# Patient Record
Sex: FEMALE | NOT HISPANIC OR LATINO | Employment: UNEMPLOYED | URBAN - METROPOLITAN AREA
[De-identification: names, ages, dates, MRNs, and addresses within clinical notes are randomized per-mention and may not be internally consistent; named-entity substitution may affect disease eponyms.]

---
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
## 2021-06-24 ENCOUNTER — OFFICE VISIT (OUTPATIENT)
Dept: FAMILY MEDICINE | Facility: CLINIC | Age: 21
End: 2021-06-24
Payer: COMMERCIAL

## 2021-06-24 ENCOUNTER — TELEPHONE (OUTPATIENT)
Dept: FAMILY MEDICINE | Facility: CLINIC | Age: 21
End: 2021-06-24

## 2021-06-24 VITALS
HEART RATE: 89 BPM | HEIGHT: 66 IN | SYSTOLIC BLOOD PRESSURE: 102 MMHG | TEMPERATURE: 98.8 F | BODY MASS INDEX: 19.61 KG/M2 | OXYGEN SATURATION: 99 % | WEIGHT: 122 LBS | DIASTOLIC BLOOD PRESSURE: 64 MMHG | RESPIRATION RATE: 14 BRPM

## 2021-06-24 DIAGNOSIS — N73.0 PID (ACUTE PELVIC INFLAMMATORY DISEASE): Primary | ICD-10-CM

## 2021-06-24 DIAGNOSIS — R59.0 LYMPHADENOPATHY, INGUINAL: ICD-10-CM

## 2021-06-24 DIAGNOSIS — Z11.4 SCREENING FOR HIV (HUMAN IMMUNODEFICIENCY VIRUS): ICD-10-CM

## 2021-06-24 DIAGNOSIS — N91.2 AMENORRHEA: ICD-10-CM

## 2021-06-24 DIAGNOSIS — M25.551 PAIN IN JOINT, PELVIC REGION AND THIGH, RIGHT: ICD-10-CM

## 2021-06-24 DIAGNOSIS — Z86.19 HISTORY OF CHLAMYDIA INFECTION: ICD-10-CM

## 2021-06-24 DIAGNOSIS — Z11.3 SCREEN FOR STD (SEXUALLY TRANSMITTED DISEASE): ICD-10-CM

## 2021-06-24 DIAGNOSIS — Z11.59 NEED FOR HEPATITIS C SCREENING TEST: ICD-10-CM

## 2021-06-24 DIAGNOSIS — Z11.3 SCREENING FOR STDS (SEXUALLY TRANSMITTED DISEASES): ICD-10-CM

## 2021-06-24 LAB
BASOPHILS # BLD AUTO: 0 10E9/L (ref 0–0.2)
BASOPHILS NFR BLD AUTO: 0.3 %
DIFFERENTIAL METHOD BLD: NORMAL
EOSINOPHIL # BLD AUTO: 0.7 10E9/L (ref 0–0.7)
EOSINOPHIL NFR BLD AUTO: 7.8 %
ERYTHROCYTE [DISTWIDTH] IN BLOOD BY AUTOMATED COUNT: 12.7 % (ref 10–15)
HCG UR QL: NEGATIVE
HCT VFR BLD AUTO: 42.2 % (ref 35–47)
HGB BLD-MCNC: 14.3 G/DL (ref 11.7–15.7)
LYMPHOCYTES # BLD AUTO: 3.4 10E9/L (ref 0.8–5.3)
LYMPHOCYTES NFR BLD AUTO: 38.2 %
MCH RBC QN AUTO: 30.3 PG (ref 26.5–33)
MCHC RBC AUTO-ENTMCNC: 33.9 G/DL (ref 31.5–36.5)
MCV RBC AUTO: 89 FL (ref 78–100)
MONOCYTES # BLD AUTO: 0.7 10E9/L (ref 0–1.3)
MONOCYTES NFR BLD AUTO: 7.5 %
NEUTROPHILS # BLD AUTO: 4.1 10E9/L (ref 1.6–8.3)
NEUTROPHILS NFR BLD AUTO: 46.2 %
PLATELET # BLD AUTO: 338 10E9/L (ref 150–450)
RBC # BLD AUTO: 4.72 10E12/L (ref 3.8–5.2)
SPECIMEN SOURCE: NORMAL
WBC # BLD AUTO: 8.8 10E9/L (ref 4–11)
WET PREP SPEC: NORMAL

## 2021-06-24 PROCEDURE — 87591 N.GONORRHOEAE DNA AMP PROB: CPT | Performed by: INTERNAL MEDICINE

## 2021-06-24 PROCEDURE — 99000 SPECIMEN HANDLING OFFICE-LAB: CPT | Performed by: INTERNAL MEDICINE

## 2021-06-24 PROCEDURE — 86780 TREPONEMA PALLIDUM: CPT | Mod: 90 | Performed by: INTERNAL MEDICINE

## 2021-06-24 PROCEDURE — 87389 HIV-1 AG W/HIV-1&-2 AB AG IA: CPT | Performed by: INTERNAL MEDICINE

## 2021-06-24 PROCEDURE — 86696 HERPES SIMPLEX TYPE 2 TEST: CPT | Performed by: INTERNAL MEDICINE

## 2021-06-24 PROCEDURE — 87210 SMEAR WET MOUNT SALINE/INK: CPT | Performed by: INTERNAL MEDICINE

## 2021-06-24 PROCEDURE — 36415 COLL VENOUS BLD VENIPUNCTURE: CPT | Performed by: INTERNAL MEDICINE

## 2021-06-24 PROCEDURE — 87491 CHLMYD TRACH DNA AMP PROBE: CPT | Performed by: INTERNAL MEDICINE

## 2021-06-24 PROCEDURE — 99203 OFFICE O/P NEW LOW 30 MIN: CPT | Mod: 25 | Performed by: INTERNAL MEDICINE

## 2021-06-24 PROCEDURE — 81025 URINE PREGNANCY TEST: CPT | Performed by: INTERNAL MEDICINE

## 2021-06-24 PROCEDURE — 86695 HERPES SIMPLEX TYPE 1 TEST: CPT | Performed by: INTERNAL MEDICINE

## 2021-06-24 PROCEDURE — 85025 COMPLETE CBC W/AUTO DIFF WBC: CPT | Performed by: INTERNAL MEDICINE

## 2021-06-24 PROCEDURE — 96372 THER/PROPH/DIAG INJ SC/IM: CPT | Performed by: INTERNAL MEDICINE

## 2021-06-24 RX ORDER — CEFTRIAXONE SODIUM 1 G
1 VIAL (EA) INJECTION ONCE
Status: COMPLETED | OUTPATIENT
Start: 2021-06-24 | End: 2021-06-24

## 2021-06-24 RX ORDER — DOXYCYCLINE 100 MG/1
100 CAPSULE ORAL 2 TIMES DAILY
Qty: 28 CAPSULE | Refills: 0 | Status: SHIPPED | OUTPATIENT
Start: 2021-06-24 | End: 2021-07-08

## 2021-06-24 RX ORDER — ALBUTEROL SULFATE 90 UG/1
AEROSOL, METERED RESPIRATORY (INHALATION)
COMMUNITY
Start: 2021-03-03 | End: 2022-09-26 | Stop reason: ALTCHOICE

## 2021-06-24 RX ORDER — METRONIDAZOLE 500 MG/1
500 TABLET ORAL 2 TIMES DAILY
Qty: 28 TABLET | Refills: 0 | Status: SHIPPED | OUTPATIENT
Start: 2021-06-24 | End: 2021-07-08

## 2021-06-24 RX ADMIN — Medication 1 G: at 18:05

## 2021-06-24 ASSESSMENT — MIFFLIN-ST. JEOR: SCORE: 1340.14

## 2021-06-24 NOTE — PROGRESS NOTES
Assessment & Plan     Diagnoses and all orders for this visit:    PID (acute pelvic inflammatory disease)  -     CEFTRIAXONE NA INJ /250MG  -     cefTRIAXone (ROCEPHIN) injection 1 g  -     metroNIDAZOLE (FLAGYL) 500 MG tablet; Take 1 tablet (500 mg) by mouth 2 times daily for 14 days  -     doxycycline hyclate (VIBRAMYCIN) 100 MG capsule; Take 1 capsule (100 mg) by mouth 2 times daily for 14 days    Screening for STDs (sexually transmitted diseases)  -     Wet prep    Screening for HIV (human immunodeficiency virus)    Need for hepatitis C screening test    Amenorrhea  -     HCG Qual, Urine ([id number])    Pain in joint, pelvic region and thigh, right  -     NEISSERIA GONORRHOEA PCR  -     CHLAMYDIA TRACHOMATIS PCR  -     US Pelvic Complete with Transvaginal; Future  -     CBC with platelets and differential  -     Wet prep    Screen for STD (sexually transmitted disease)  -     Treponema Abs w Reflex to RPR and Titer  -     HIV Antigen Antibody Combo  -     Herpes Simplex Virus 1 and 2 IgG    Lymphadenopathy, inguinal  -     CBC with platelets and differential  -     CEFTRIAXONE NA INJ /250MG  -     cefTRIAXone (ROCEPHIN) injection 1 g  -     metroNIDAZOLE (FLAGYL) 500 MG tablet; Take 1 tablet (500 mg) by mouth 2 times daily for 14 days    History of chlamydia infection    Other orders  -     REVIEW OF HEALTH MAINTENANCE PROTOCOL ORDERS       Patient with recent history of chlamydia treated doxycycline alone, presented with symptoms consistent with pelvic inflammatory syndrome.UPT negative. Will treat for PID. We ordered STD testing but there may be interference with testing from moderate amount of blood. She may be at the onset of menses.     Return for if no improvement or worsening.    I spent a total of 40 minutes on the day of the visit.  doing chart review, history and exam, documentation and further activities as noted above       Tony Flower MD PhD  [facility]  "is a 20 year old who presents for the following health issues     OCP since age 12. Went off last August.   She was on impipramine for anxiety. Stopped last August.   Menses have been regular, usually around the beginning of the month or the end of the month.     She and her boyfriend were trying to conceive.  Last menstrual period May 2, 2021.  Last night she started spotting.  Severe cramping.  She noticed some lumps in the groin.    Patient was diagnosed with Gardnerella and chlamydia in March.  Both she and her boyfriend have been treated.  No new sexual partners.      History of Present Illness       She eats 0-1 servings of fruits and vegetables daily.She consumes 4 sweetened beverage(s) daily.She exercises with enough effort to increase her heart rate 10 to 19 minutes per day.  She exercises with enough effort to increase her heart rate 3 or less days per week.   She is taking medications regularly.       Concern - concerns for possible miscarriage/pregnancy  Onset: last night  Description: pt states that she had mild spotting and severe cramps. Pt states that her boyfriend and her have percy trying to conceive a child for the last 1-2 months, first day of last period was 05/2/21. It came 1 week early.     Spotting last night, light bleeding. Barely any clotting. A lot of cramping.         Review of Systems   Constitutional, HEENT, cardiovascular, pulmonary, gi and gu systems are negative, except as otherwise noted.      Objective    /64 (BP Location: Right arm, Patient Position: Sitting, Cuff Size: Adult Regular)   Pulse 89   Temp 98.8  F (37.1  C) (Tympanic)   Resp 14   Ht 1.676 m (5' 6\")   Wt 55.3 kg (122 lb)   LMP 05/26/2021   SpO2 99%   BMI 19.69 kg/m    Body mass index is 19.69 kg/m .  Physical Exam   GENERAL: healthy, alert and no distress  EYES: Eyes grossly normal to inspection, PERRL and conjunctivae and sclerae normal  HENT: ear canals and TM's normal, nose and mouth without ulcers or " lesions  RESP: lungs clear to auscultation - no rales, rhonchi or wheezes  CV: regular rate and rhythm, normal S1 S2, no S3 or S4, no murmur, click or rub, no peripheral edema and peripheral pulses strong  ABDOMEN: bowel sounds normal and Moderate tenderness in the right adnexal area   (female): normal female external genitalia, normal urethral meatus, positive for cervical motion tenderness.  There is moderate amount of bleeding.  Groin: Multiple mildly tender lymph nodes in the groin bilaterally.  NEURO: Normal strength and tone, mentation intact and speech normal  PSYCH: mentation appears normal, affect normal/bright    Component      Latest Ref Rng & Units 6/24/2021   HCG Qual Urine      NEG:Negative Negative     Component      Latest Ref Rng & Units 6/24/2021           5:38 PM   WBC      4.0 - 11.0 10e9/L 8.8   RBC Count      3.8 - 5.2 10e12/L 4.72   Hemoglobin      11.7 - 15.7 g/dL 14.3   Hematocrit      35.0 - 47.0 % 42.2   MCV      78 - 100 fl 89   MCH      26.5 - 33.0 pg 30.3   MCHC      31.5 - 36.5 g/dL 33.9   RDW      10.0 - 15.0 % 12.7   Platelet Count      150 - 450 10e9/L 338   % Neutrophils      % 46.2   % Lymphocytes      % 38.2   % Monocytes      % 7.5   % Eosinophils      % 7.8   % Basophils      % 0.3   Absolute Neutrophil      1.6 - 8.3 10e9/L 4.1   Absolute Lymphocytes      0.8 - 5.3 10e9/L 3.4   Absolute Monocytes      0.0 - 1.3 10e9/L 0.7   Absolute Eosinophils      0.0 - 0.7 10e9/L 0.7   Absolute Basophils      0.0 - 0.2 10e9/L 0.0   Diff Method       Automated Method   Specimen Description          Wet Prep

## 2021-06-24 NOTE — PATIENT INSTRUCTIONS
Patient Education     What Is Pelvic Inflammatory Disease (PID)?  Pelvic inflammatory disease (PID) is an infection of the reproductive organs. Left untreated, it can cause severe damage to the body. PID sometimes causes symptoms bad enough to send you to the emergency room. But in many cases, PID is a silent infection with few or no symptoms. The infection can be treated. This can help prevent lasting damage.  Who gets PID?  PID can happen at any age. But most women get it in their late teens or early 20s. Many don t know they have PID until years later. The longer a woman is infected, the higher her risk of more health problems. The more sexual partners you have, the higher the risk. You are also more likely to get PID if you have had it before.   What are the symptoms?  If PID symptoms do happen, they are similar to those of many other health problems. This can make PID hard to find. Symptoms can include:    Pelvic pain    Pain during sex, or bleeding afterward    Painful or frequent urination    Fever, chills, or other flu-like symptoms    Vaginal discharge with a bad odor    Abnormal vaginal bleeding     Upset stomach and vomiting    Pain in the upper right belly  How did I get PID?    PID happens when certain bacteria infect the reproductive organs. Often this happens because you are infected with an STI (sexually transmitted infection). In a few cases, women develop PID while using an IUD (intrauterine device) for birth control. This often happens in the first 3 weeks after the IUD is inserted. PID is thought to happen in this way:   1. Semen is sent from the penis into the vagina during sex. STI-causing bacteria may enter with the semen.  2. Bacteria may go through the cervix and enter the uterus.  3. Bacteria travel from the uterus into the fallopian tubes and ovaries. These become infected.  4. The infection can leave the fallopian tubes and spread to other parts of the body.  Treatment can help  When  PID is found and treated early, it can often be cured. But if not treated, PID can cause severe health problems. These include damage to the reproductive organs, pelvic pain, and problems getting pregnant (infertility). In rare cases, PID complications can even be life-threatening. This is why PID should be treated as soon as possible.  Thi last reviewed this educational content on 6/1/2019 2000-2021 [employer]. All rights reserved. This information is not intended as a substitute for professional medical care. Always follow your healthcare professional's instructions.

## 2021-06-24 NOTE — TELEPHONE ENCOUNTER
"New patient to [facility].  Has been scheduled for an appointment with Tony Bruno today at 4:40 PM and is transferred to triage due to patient believing that she may be having a miscarriage.    Patient has been trying to conceive   Was seen at a clinic in [facility] 1 month ago and was told that she had a missed pregnancy   Her menstrual period was 1 week early last month  She took a home pregnancy test 2-3 weeks ago and it was negative  Had pelvic cramping 2 days ago following sexual intercourse.  Stared spotting last night along with constant pelvic cramping.   This would be 2-3 days early for her menstrual period  Pain is at a 5/10 on pain scale at present  Patient believes she may be pregnant or having a miscarriage and would like this checked out asap  Has been \"feeling off\" like she is pregnant with breast fullness, back pain, fatigue, and feeling emotional    Advised that  made her an appointment for today with Tony Bruno at 4:40 PM  Patient verbalized understanding and plans on keeping the appointment     Brad Lopez RN  [facility]        "

## 2021-06-25 LAB
HIV 1+2 AB+HIV1 P24 AG SERPL QL IA: NONREACTIVE
T PALLIDUM AB SER QL: NONREACTIVE

## 2021-06-26 LAB
C TRACH DNA SPEC QL NAA+PROBE: NEGATIVE
N GONORRHOEA DNA SPEC QL NAA+PROBE: NEGATIVE
SPECIMEN SOURCE: NORMAL
SPECIMEN SOURCE: NORMAL

## 2021-06-28 LAB
HSV1 IGG SERPL QL IA: <0.2 AI (ref 0–0.8)
HSV2 IGG SERPL QL IA: <0.2 AI (ref 0–0.8)

## 2021-07-01 ENCOUNTER — TELEPHONE (OUTPATIENT)
Dept: FAMILY MEDICINE | Facility: CLINIC | Age: 21
End: 2021-07-01

## 2021-07-01 NOTE — TELEPHONE ENCOUNTER
Negative test in her case could be due to the presence of blood. To be on the safe side, I would recommend she finish doxycyline. Okay to stop the flagyl, which is likely the one that made her feel sick. Okay to stop the flagyl (metronidazole).

## 2021-07-01 NOTE — TELEPHONE ENCOUNTER
This writer attempted to contact Iza on 07/01/21      Reason for call results and left message.      If patient calls back:   Registered Nurse called. Follow Triage Call workflow, Jostin Turk, FREDY

## 2021-07-01 NOTE — RESULT ENCOUNTER NOTE
Please call pt: STD screen were all negative. However pelvic exam swab for chlamydia and gonorrhea could be false negative due to the presence of large amount of blood. If her pain has improved, complete the antibiotic course. Refrain sexual activity. I recommend her boyfriend be tested for chlamydia to be sure chlamydia has been eradicated from the march episode. If she is still having pain issues, please follow up to re-evaluate.     Tony Flower MD-PhD

## 2021-07-01 NOTE — TELEPHONE ENCOUNTER
Patient notified of Provider's message as written.  Patient verbalized understanding.    Brad Lopez RN  [facility]

## 2021-07-01 NOTE — TELEPHONE ENCOUNTER
Patient informed of provider result note as below  Patient is reluctant to take both antibiotics for 14 days if possible negative  Took both meds x1 and felt very sick to her stomach, felt fatigued and had a migraine    To provider to advise      Chela JARAMILLON, RN

## 2021-07-01 NOTE — TELEPHONE ENCOUNTER
Tony Flower MD PhD sent to [facility]             Please call pt: STD screen were all negative. However pelvic exam swab for chlamydia and gonorrhea could be false negative due to the presence of large amount of blood. If her pain has improved, complete the antibiotic course. Refrain sexual activity. I recommend her boyfriend be tested for chlamydia to be sure chlamydia has been eradicated from the march episode. If she is still having pain issues, please follow up to re-evaluate.     Tony Flower MD-PhD

## 2021-10-18 ENCOUNTER — NURSE TRIAGE (OUTPATIENT)
Dept: NURSING | Facility: CLINIC | Age: 21
End: 2021-10-18

## 2021-10-18 NOTE — TELEPHONE ENCOUNTER
Yesterday sinus infection , sinus sinus green drainage.  17 weeks pregnant.    Temp 99    Needs to be seen in 24 hour or uc.    Warm transferred to [facility]    Dorita Lopez RN  [facility] Nurse Advisor    Reason for Disposition    [1] Sinus pain (not just congestion) AND [2] fever    Additional Information    Negative: Severe difficulty breathing (e.g., struggling for each breath, speaks in single words)    Negative: Sounds like a life-threatening emergency to the triager    Negative: [1] Sinus infection AND [2] taking an antibiotic AND [3] symptoms continue    Negative: [1] Difficulty breathing AND [2] not from stuffy nose (e.g., not relieved by cleaning out the nose)    Negative: [1] SEVERE headache AND [2] fever    Negative: [1] Redness or swelling on the cheek, forehead or around the eye AND [2] fever    Negative: Fever > 104 F (40 C)    Negative: Patient sounds very sick or weak to the triager    Negative: [1] SEVERE pain AND [2] not improved 2 hours after pain medicine    Negative: [1] Redness or swelling on the cheek, forehead or around the eye AND [2] no fever    Negative: [1] Fever > 101 F (38.3 C) AND [2] age > 60    Negative: [1] Fever > 100.0 F (37.8 C) AND [2] bedridden (e.g., nursing home patient, CVA, chronic illness, recovering from surgery)    Negative: [1] Fever > 100.0 F (37.8 C) AND [2] diabetes mellitus or weak immune system (e.g., HIV positive, cancer chemo, splenectomy, organ transplant, chronic steroids)    Negative: Fever present > 3 days (72 hours)    Negative: [1] Fever returns after gone for over 24 hours AND [2] symptoms worse or not improved    Protocols used: SINUS PAIN OR CONGESTION-A-

## 2022-04-27 ENCOUNTER — MEDICAL CORRESPONDENCE (OUTPATIENT)
Dept: HEALTH INFORMATION MANAGEMENT | Facility: CLINIC | Age: 22
End: 2022-04-27
Payer: COMMERCIAL

## 2022-04-27 ENCOUNTER — TELEPHONE (OUTPATIENT)
Dept: PEDIATRICS | Facility: OTHER | Age: 22
End: 2022-04-27
Payer: COMMERCIAL

## 2022-04-27 NOTE — TELEPHONE ENCOUNTER
Just letting you know that patients POST-Man depression scale was at total score of 13.    Luara Hodge MA

## 2022-05-31 ENCOUNTER — MEDICAL CORRESPONDENCE (OUTPATIENT)
Dept: HEALTH INFORMATION MANAGEMENT | Facility: CLINIC | Age: 22
End: 2022-05-31
Payer: COMMERCIAL

## 2022-08-02 ENCOUNTER — MEDICAL CORRESPONDENCE (OUTPATIENT)
Dept: HEALTH INFORMATION MANAGEMENT | Facility: CLINIC | Age: 22
End: 2022-08-02

## 2022-09-26 ENCOUNTER — HOSPITAL ENCOUNTER (EMERGENCY)
Facility: CLINIC | Age: 22
Discharge: HOME | End: 2022-09-26
Attending: PHYSICIAN ASSISTANT | Admitting: PHYSICIAN ASSISTANT
Payer: COMMERCIAL

## 2022-09-26 VITALS
OXYGEN SATURATION: 97 % | HEART RATE: 112 BPM | SYSTOLIC BLOOD PRESSURE: 126 MMHG | RESPIRATION RATE: 18 BRPM | DIASTOLIC BLOOD PRESSURE: 70 MMHG

## 2022-09-26 DIAGNOSIS — J06.9 VIRAL URI WITH COUGH: ICD-10-CM

## 2022-09-26 DIAGNOSIS — J45.901 ASTHMA EXACERBATION: ICD-10-CM

## 2022-09-26 PROCEDURE — 99284 EMERGENCY DEPT VISIT MOD MDM: CPT | Performed by: PHYSICIAN ASSISTANT

## 2022-09-26 RX ORDER — ALBUTEROL SULFATE 90 UG/1
2 AEROSOL, METERED RESPIRATORY (INHALATION) EVERY 6 HOURS
Qty: 18 G | Refills: 0 | Status: SHIPPED | OUTPATIENT
Start: 2022-09-26

## 2022-09-26 RX ORDER — PREDNISONE 20 MG/1
20 TABLET ORAL 2 TIMES DAILY
Qty: 10 TABLET | Refills: 0 | Status: SHIPPED | OUTPATIENT
Start: 2022-09-26 | End: 2022-10-01

## 2022-09-26 RX ORDER — IPRATROPIUM BROMIDE AND ALBUTEROL SULFATE 2.5; .5 MG/3ML; MG/3ML
3 SOLUTION RESPIRATORY (INHALATION) ONCE
Status: DISCONTINUED | OUTPATIENT
Start: 2022-09-26 | End: 2022-09-26 | Stop reason: HOSPADM

## 2022-09-27 NOTE — ED PROVIDER NOTES
History     Chief Complaint   Patient presents with     URI     HPI  Iza Roberson is a 21 year old female with history of asthma and allergies who presents for evaluation of cough and dyspnea.  She was previously on MDI and pill therapy for her asthma and allergies.  She ran out of the medication and does not have a refill.  Started with cough and dyspnea 2 days ago.  She has 3 dogs at home and a known dog allergy.  Also has pretty severe fall seasonal allergies.  She states that her whole house is ill with viral URIs.  She does not think this is COVID.  She had COVID in the winter 2022 and states that this is nothing like that.  Her daughter was just recently diagnosed with croup.  She does have some rhinorrhea and congestion.  Denies any sinus pain or pressure.  No fevers or chills.  No chest pain.  Denies any lower extremity edema or calf discomfort.  No prior history of venous thromboembolism.    Allergies:  Allergies   Allergen Reactions     Azithromycin      Hydroxyzine Hives     9/18/15 - extensive hives - seen at [facility] in [place] and prescribed prednisone.      Metronidazole GI Disturbance     Vomiting     Propranolol Itching     Soap Hives     Lever 2000 soap     Cetirizine Rash     Sulfa Drugs Hives and Rash     Universal maculopapular rash rash - see photos in note from 9/17/10       Problem List:    There are no problems to display for this patient.       Past Medical History:    No past medical history on file.    Past Surgical History:    No past surgical history on file.    Family History:    Family History   Problem Relation Age of Onset     Asthma Mother      Hypertension Mother      Hypertension Maternal Grandfather      Hypertension Paternal Grandmother        Social History:  Marital Status:  Single [1]  Social History     Tobacco Use     Smoking status: Current Some Day Smoker     Types: Other     Smokeless tobacco: Former User     Types: Chew   Substance Use  Topics     Alcohol use: Yes     Comment: minimal/social     Drug use: Yes     Types: Marijuana        Medications:    albuterol (VENTOLIN HFA) 108 (90 Base) MCG/ACT inhaler  predniSONE (DELTASONE) 20 MG tablet          Review of Systems   All other systems reviewed and are negative.      Physical Exam   BP: (!) (P) 144/79  Pulse: (P) 106  Temp: (P) 98  F (36.7  C)  Resp: (P) 18  Weight: (P) 73 kg (161 lb)  SpO2: 98 %      Physical Exam  Vitals and nursing note reviewed.   Constitutional:       General: She is not in acute distress.     Appearance: She is not diaphoretic.   HENT:      Head: Normocephalic and atraumatic.      Right Ear: Tympanic membrane, ear canal and external ear normal.      Left Ear: Tympanic membrane, ear canal and external ear normal.      Nose: Nose normal. No congestion or rhinorrhea.      Mouth/Throat:      Mouth: Mucous membranes are moist.      Pharynx: No oropharyngeal exudate.   Eyes:      General: No scleral icterus.        Right eye: No discharge.         Left eye: No discharge.      Conjunctiva/sclera: Conjunctivae normal.      Pupils: Pupils are equal, round, and reactive to light.   Neck:      Thyroid: No thyromegaly.   Cardiovascular:      Rate and Rhythm: Normal rate and regular rhythm.      Heart sounds: Normal heart sounds. No murmur heard.  Pulmonary:      Effort: Pulmonary effort is normal. No respiratory distress.      Breath sounds: Wheezing (Bilateral expiratory) present. No rales.   Chest:      Chest wall: No tenderness.   Abdominal:      General: Bowel sounds are normal. There is no distension.      Palpations: Abdomen is soft. There is no mass.      Tenderness: There is no abdominal tenderness. There is no guarding or rebound.   Musculoskeletal:         General: No tenderness or deformity. Normal range of motion.      Cervical back: Normal range of motion and neck supple.      Right lower leg: No edema.      Left lower leg: No edema.      Comments: Negative Homans' sign.    Lymphadenopathy:      Cervical: No cervical adenopathy.   Skin:     General: Skin is warm and dry.      Capillary Refill: Capillary refill takes less than 2 seconds.      Findings: No erythema or rash.   Neurological:      Mental Status: She is alert and oriented to person, place, and time.      Cranial Nerves: No cranial nerve deficit.   Psychiatric:         Behavior: Behavior normal.         Thought Content: Thought content normal.         ED Course                 Procedures              Critical Care time:  none               No results found for this or any previous visit (from the past 24 hour(s)).    Medications   ipratropium - albuterol 0.5 mg/2.5 mg/3 mL (DUONEB) neb solution 3 mL (3 mLs Nebulization Incomplete 9/26/22 2034)   predniSONE (DELTASONE) tablet 50 mg (50 mg Oral Incomplete 9/26/22 2034)       Assessments & Plan (with Medical Decision Making)     Viral URI with cough  Asthma exacerbation     21 year old female with a history of asthma and allergies who presents for evaluation of increased symptoms over the past 2 days.  Dyspnea, rhinorrhea, congestion, dry cough, and wheezing.  She does not have any of her asthma or allergy medication at home.  House is ill with viral URIs.  See HPI for details.  Denies any current fever or chills.  Exam with blood pressure 144/79, temperature 98.0, pulse 106, respiration 18.  She has expiratory wheezing bilateral.  No rhonchi.  No abdominal discomfort.  No lower extremity edema or calf tenderness.  ENT exam normal.  Work-up offered, but the patient declined.  She does not feel that this is COVID-19.  She also does not feel that this is pneumonia.  She has not had a fever or chills.  She just really wants to get back on her asthma medication so that she can breathe better.  She did except a DuoNeb treatment here in the ED.  We started her with 50 mg of prednisone as well.  Patient reported significant improvement and was requesting discharge.  She was sent home  with prednisone 20 mg twice daily for an additional 5 days.  Albuterol MDI prescribed as well.  Uses 2 puffs every 4 hours on a regular basis for the next 3 days and then as needed after that.  Could use OTC Zyrtec 10 mg daily as well.  Indications for ED return reviewed.  She was in agreement and was suitable for discharge.     I have reviewed the nursing notes.    I have reviewed the findings, diagnosis, plan and need for follow up with the patient.       New Prescriptions    ALBUTEROL (VENTOLIN HFA) 108 (90 BASE) MCG/ACT INHALER    Inhale 2 puffs into the lungs every 6 hours    PREDNISONE (DELTASONE) 20 MG TABLET    Take 1 tablet (20 mg) by mouth 2 times daily for 5 days       Final diagnoses:   Viral URI with cough   Asthma exacerbation     Disclaimer: This note consists of symbols derived from keyboarding, dictation and/or voice recognition software. As a result, there may be errors in the script that have gone undetected. Please consider this when interpreting information found in this chart.      9/26/2022   [facility] EMERGENCY DEPT     Diego Paredes PA-C  09/26/22 2052

## 2022-09-27 NOTE — DISCHARGE INSTRUCTIONS
It was a pleasure working with you today!  I hope your condition improves rapidly!     As we discussed, it appears that your body is battling the virus that is going through your household and has flared your asthma.  The prednisone should help balance out your asthma along with the inhaler.    You were given your first dose of prednisone here in the emergency department.  Take your next dose tomorrow.  Please use your albuterol inhaler 2 puffs every 3-4 hours for the next 2-3 days on a regular basis.  Then switch to using it as needed.  Return to the emergency department if you develop chest pain or increasing shortness of breath.  It is okay to use Zyrtec 10 mg once daily to help with your allergy symptoms as well.

## 2022-09-27 NOTE — ED TRIAGE NOTES
Pt presents with URI symptoms and is out of her inhaler for her asthma. pts baby has croup     Triage Assessment     Row Name 09/26/22 1915       Triage Assessment (Adult)    Airway WDL WDL       Respiratory WDL    Respiratory WDL X;cough       Cardiac WDL    Cardiac WDL WDL

## 2022-11-05 ENCOUNTER — HOSPITAL ENCOUNTER (EMERGENCY)
Facility: CLINIC | Age: 22
Discharge: HOME | End: 2022-11-05
Attending: FAMILY MEDICINE | Admitting: FAMILY MEDICINE
Payer: COMMERCIAL

## 2022-11-05 VITALS
SYSTOLIC BLOOD PRESSURE: 130 MMHG | RESPIRATION RATE: 20 BRPM | HEART RATE: 139 BPM | OXYGEN SATURATION: 97 % | DIASTOLIC BLOOD PRESSURE: 75 MMHG | TEMPERATURE: 98.1 F

## 2022-11-05 DIAGNOSIS — J45.21 EXACERBATION OF INTERMITTENT ASTHMA, UNSPECIFIED ASTHMA SEVERITY: ICD-10-CM

## 2022-11-05 LAB
FLUAV RNA SPEC QL NAA+PROBE: NEGATIVE
FLUBV RNA RESP QL NAA+PROBE: NEGATIVE
RSV RNA SPEC NAA+PROBE: NEGATIVE
SARS-COV-2 RNA RESP QL NAA+PROBE: NEGATIVE

## 2022-11-05 PROCEDURE — C9803 HOPD COVID-19 SPEC COLLECT: HCPCS

## 2022-11-05 PROCEDURE — 250N000009 HC RX 250: Performed by: FAMILY MEDICINE

## 2022-11-05 PROCEDURE — 250N000013 HC RX MED GY IP 250 OP 250 PS 637: Performed by: FAMILY MEDICINE

## 2022-11-05 PROCEDURE — 99284 EMERGENCY DEPT VISIT MOD MDM: CPT | Mod: CS | Performed by: FAMILY MEDICINE

## 2022-11-05 PROCEDURE — 94640 AIRWAY INHALATION TREATMENT: CPT

## 2022-11-05 PROCEDURE — 99284 EMERGENCY DEPT VISIT MOD MDM: CPT | Mod: CS,25

## 2022-11-05 PROCEDURE — 87637 SARSCOV2&INF A&B&RSV AMP PRB: CPT | Performed by: FAMILY MEDICINE

## 2022-11-05 RX ORDER — IPRATROPIUM BROMIDE AND ALBUTEROL SULFATE 2.5; .5 MG/3ML; MG/3ML
3 SOLUTION RESPIRATORY (INHALATION) ONCE
Status: COMPLETED | OUTPATIENT
Start: 2022-11-05 | End: 2022-11-05

## 2022-11-05 RX ORDER — PREDNISONE 10 MG/1
20 TABLET ORAL 2 TIMES DAILY
Qty: 20 TABLET | Refills: 0 | Status: SHIPPED | OUTPATIENT
Start: 2022-11-05 | End: 2022-11-10

## 2022-11-05 RX ORDER — IPRATROPIUM BROMIDE AND ALBUTEROL SULFATE 2.5; .5 MG/3ML; MG/3ML
1 SOLUTION RESPIRATORY (INHALATION) EVERY 4 HOURS PRN
Qty: 90 ML | Refills: 0 | Status: SHIPPED | OUTPATIENT
Start: 2022-11-05 | End: 2022-11-15

## 2022-11-05 RX ORDER — ALBUTEROL SULFATE 90 UG/1
2 AEROSOL, METERED RESPIRATORY (INHALATION) EVERY 4 HOURS PRN
Status: DISCONTINUED | OUTPATIENT
Start: 2022-11-05 | End: 2022-11-05 | Stop reason: HOSPADM

## 2022-11-05 RX ADMIN — IPRATROPIUM BROMIDE AND ALBUTEROL SULFATE 3 ML: 2.5; .5 SOLUTION RESPIRATORY (INHALATION) at 06:43

## 2022-11-05 RX ADMIN — ALBUTEROL SULFATE 2 PUFF: 90 AEROSOL, METERED RESPIRATORY (INHALATION) at 07:21

## 2022-11-05 RX ADMIN — IPRATROPIUM BROMIDE AND ALBUTEROL SULFATE 3 ML: 2.5; .5 SOLUTION RESPIRATORY (INHALATION) at 07:44

## 2022-11-05 ASSESSMENT — ACTIVITIES OF DAILY LIVING (ADL): ADLS_ACUITY_SCORE: 35

## 2022-11-05 NOTE — DISCHARGE INSTRUCTIONS
Continue DuoNebs every 4 hours as needed, and use your albuterol inhaler for breakthrough symptoms.  Take prednisone 20 mg in the morning and afternoon (twice a day) for 5 days.  Drink plenty of fluids and rest.  Return to the emergency department at any time if your symptoms worsen.

## 2022-11-05 NOTE — ED TRIAGE NOTES
Presents with cough, sneezing, and runny nose. Has hx of asthma. States ran out of her albuterol inhaler. Is audibly wheezing. O2 sats  in triage.     Triage Assessment     Row Name 11/05/22 0601       Triage Assessment (Adult)    Additional Documentation Breath Sounds (Group)       Breath Sounds    Breath Sounds All Fields    All Lung Fields Breath Sounds Anterior:;wheezes, high-pitched       Skin Circulation/Temperature WDL    Skin Circulation/Temperature WDL WDL       Cardiac WDL    Cardiac WDL WDL       Peripheral/Neurovascular WDL    Peripheral Neurovascular WDL WDL       Cognitive/Neuro/Behavioral WDL    Cognitive/Neuro/Behavioral WDL WDL

## 2022-11-05 NOTE — ED PROVIDER NOTES
[facility] ED Provider Note   Patient: Iza Roberson  MRN #:  [id number]  Date of Visit: November 5, 2022    CC:     Chief Complaint   Patient presents with     Wheezing     HPI:  Iza Roberson is a 21 year old female with history of intermittent asthma who presented to the emergency department with 2-day history of increasing shortness of breath and tightness in the chest.  Patient states that her asthma was triggered a couple days ago with combination of seasonal allergies, exposure to dogs, and recent cold that she contracted from her nephew/niece.  Patient does not have a fever, and has no productive cough.  She used her albuterol inhaler earlier this morning and ran out.    Problem List:  There are no problems to display for this patient.      No past medical history on file.    MEDS: ipratropium - albuterol 0.5 mg/2.5 mg/3 mL (DUONEB) 0.5-2.5 (3) MG/3ML neb solution  predniSONE (DELTASONE) 10 MG tablet  Respiratory Therapy Supplies (NEBULIZER) ZABRINA  albuterol (VENTOLIN HFA) 108 (90 Base) MCG/ACT inhaler        ALLERGIES:    Allergies   Allergen Reactions     Candida Antigen Hives     Dogs Hives     Lactose Hives     Azithromycin      Hydroxyzine Hives     9/18/15 - extensive hives - seen at [facility] in [place] and prescribed prednisone.      Metronidazole GI Disturbance     Vomiting     Propranolol Itching     Soap Hives     Lever 2000 soap     Cetirizine Rash     Sulfa Drugs Hives and Rash     Universal maculopapular rash rash - see photos in note from 9/17/10       No past surgical history on file.    Social History     Tobacco Use     Smoking status: Some Days     Types: Other     Smokeless tobacco: Former     Types: Chew   Substance Use Topics     Alcohol use: Yes     Comment: minimal/social     Drug use: Yes     Types: Marijuana         Review of Systems   Except as noted in HPI, all other systems were  Speech Treatment Note    Today's date: 2022  Patient name: Nora Crouch  : 1958  MRN: [id number]  Referring provider: Terra Lorenzana MD  Dx:   Encounter Diagnosis     ICD-10-CM    1  Dysphagia, oropharyngeal phase  R13 12    2  Neurological disorder  G98 8    3  Failure to thrive in adult  R62 7                   Visit tracking:  -Referring provider: Non-Epic  -Billing guidelines: CMS  -Visit # (-3/6)  -Insurance: [address] [employer]  -RE Due: 22  -Progress Note due: 22    Subjective: Pt arrived with her , arrived late  1  Patient will complete daily oral motor exercises to increase lingual/labial range of motion, strength, and coordination with min cues to 90% effectiveness to improve bolus formation and strengthen oral musculature, to be achieved in 4-6 weeks  To target strengthening the jaw, lips cheeks and tongue pt performed the following OME's with mirror for visual feedback:    Exercise 1: smile (retract lips) show upper and lower teeth and gums in a wide grin while clenching teeth gently  She was asked to hold for 5 seconds-completed this task with 70% acc increased success with mod verbal cues, model, and visual reinforcer  Patient noted to have difficulty retracting lips on command and visible tongue thrusting was noted as she tried to retract lips  3  Patient will perform compensatory strategies (e g , upright positioning, small bites/sips, slow rate, alternation of consistencies, multiple swallows, effortful swallow  ) with 80% accuracy to eliminate overt s/sx penetration/aspiration of least restrictive food/liquid consistencies, to be achieved in 4-6 weeks  Patient consumed chocolate pudding (3 bites) via 1/2 teaspoon sized servings  Patient self fed  Patient was able to strip approximately 80% of bolus from spoon per trial and required min-mod verbal cues to improve success   Patient independently took 4 effortful swallows after every bite and required min verbal cues to utilize lingual sweep to clear residue  No overt distress or s/s of aspiration or dysphagia noted with small amounts of pudding  Patient more consistently able to generate multiple swallows  4  Patient will participate in trial of AAC to aid expressive language        Recommendations:Continue with Plan of Care reviewed and are negative    Physical Exam     Vitals were reviewed  Patient Vitals for the past 12 hrs:   BP Temp Temp src Pulse Resp SpO2   11/05/22 0740 124/69 -- -- 92 -- 95 %   11/05/22 0738 -- -- -- -- -- 97 %   11/05/22 0600 114/77 98.6  F (37  C) Oral 92 22 97 %     GENERAL APPEARANCE: Alert and oriented x3, moderate distress due to wheezing and shortness of breath  FACE: normal facies  EYES: Pupils are equal  HENT: normal external exam  NECK: no adenopathy or asymmetry  RESP: Increased respiratory effort; bilateral expiratory wheezes, prolonged expiratory phase;  CV: regular rate and rhythm; no significant murmurs, gallops or rubs  ABD: soft, no tenderness; no rebound or guarding; bowel sounds are normal  EXT: No calf tenderness or pitting edema  SKIN: no worrisome rash  NEURO: no facial droop; no focal deficits, speech is in 4-5 word sentences        Available Lab/Imaging Results     Results for orders placed or performed during the hospital encounter of 11/05/22 (from the past 24 hour(s))   Symptomatic; Yes; 11/5/2022 Influenza A/B & SARS-CoV2 (COVID-19) Virus PCR Multiplex Nose    Specimen: Nose; Swab   Result Value Ref Range    Influenza A PCR Negative Negative    Influenza B PCR Negative Negative    RSV PCR Negative Negative    SARS CoV2 PCR Negative Negative    Narrative    Testing was performed using the Xpert Xpress CoV2/Flu/RSV Assay on the [employer] GeneXpert Instrument. This test should be ordered for the detection of SARS-CoV-2 and influenza viruses in individuals who meet clinical and/or epidemiological criteria. Test performance is unknown in asymptomatic patients. This test is for in vitro diagnostic use under the FDA EUA for laboratories certified under CLIA to perform high or moderate complexity testing. This test has not been FDA cleared or approved. A negative result does not rule out the presence of PCR inhibitors in the specimen or target RNA in concentration below the limit of detection  for the assay. If only one viral target is positive but coinfection with multiple targets is suspected, the sample should be re-tested with another FDA cleared, approved, or authorized test, if coinfection would change clinical management. This test was validated by the [facility]. These laboratories are certified under the Clinical Laboratory Improvement Amendments of 1988 (CLIA-88) as qualified to perform high complexity laboratory testing.                Impression     Final diagnoses:   Exacerbation of intermittent asthma         ED Course & Medical Decision Making   Iza Roberson is a 21 year old female who presented to the emergency department with acute asthma exacerbation that started couple days ago.  Her triggers were combination of an upper respiratory infection, exposure to dogs, and her seasonal allergies.  Patient ran out of her albuterol inhaler this morning.  Vital signs reveal a temp of 98.6, blood pressure 124/69, respiratory rate of 22 with 97% oxygen saturation.  Exam reveals mild to moderate respiratory distress with prolonged expiratory phase.  Lungs revealed bilateral expiratory wheezes.  Patient received an albuterol neb with improvement in symptoms.  She started develop tightness of the chest about an hour later, and was given a DuoNeb.  Patient will continue with DuoNeb treatments at home every 4 hours, and her albuterol inhaler for breakthrough symptoms.  Begin prednisone 20 mg twice a day for 5 days.  Patient was urged to return to the ED at any time if her symptoms worsen.  If not improved in 3 to 5 days, follow-up with her primary care provider.        Written after-visit summary and instructions were given at the time of discharge.    Follow up Plan:   [facility] Emergency Dept  [address] [place] [id number] [phone]    If symptoms worsen      Discharge Instructions:   Continue DuoNebs every 4 hours as needed, and use  your albuterol inhaler for breakthrough symptoms.  Take prednisone 20 mg in the morning and afternoon (twice a day) for 5 days.  Drink plenty of fluids and rest.  Return to the emergency department at any time if your symptoms worsen.       Disclaimer: This note consists of words and symbols derived from keyboarding and dictation using voice recognition software.  As a result, there may be errors that have gone undetected.  Please consider this when interpreting information found in this note.       Edilberto Larios MD  11/05/22 075

## 2022-11-30 ENCOUNTER — TELEPHONE (OUTPATIENT)
Dept: FAMILY MEDICINE | Facility: CLINIC | Age: 22
End: 2022-11-30

## 2022-11-30 NOTE — TELEPHONE ENCOUNTER
"Patient calling with symptoms of ear pressure, where ear sounds and feels like it is \"muffled\", and sinus congestion. She is wondering if there is anything she can take that is safe when breastfeeding. Patient states this has been going on 2-3 days. She states she tested positive for RSV last week at an ED at another facility.    RN advised patient should be able to take any of the medications listed on our \"safe for pregnancy list\" but advised her she would need to confirm with a provider. RN advised her to contact her primary provider. Patient was going to try contacting [facility]. Otherwise RN advised her I could schedule a VU appointment to discuss options with a provider.     ELLA ContiN, RN     "

## 2022-12-12 ENCOUNTER — HOSPITAL ENCOUNTER (EMERGENCY)
Facility: CLINIC | Age: 22
Discharge: HOME | End: 2022-12-12
Attending: FAMILY MEDICINE | Admitting: FAMILY MEDICINE
Payer: COMMERCIAL

## 2022-12-12 VITALS
HEART RATE: 73 BPM | RESPIRATION RATE: 18 BRPM | DIASTOLIC BLOOD PRESSURE: 73 MMHG | HEIGHT: 66 IN | SYSTOLIC BLOOD PRESSURE: 120 MMHG | WEIGHT: 157.7 LBS | TEMPERATURE: 97.6 F | OXYGEN SATURATION: 97 % | BODY MASS INDEX: 25.34 KG/M2

## 2022-12-12 DIAGNOSIS — H65.91 OME (OTITIS MEDIA WITH EFFUSION), RIGHT: ICD-10-CM

## 2022-12-12 DIAGNOSIS — H92.01 OTALGIA, RIGHT: ICD-10-CM

## 2022-12-12 DIAGNOSIS — H10.33 ACUTE CONJUNCTIVITIS OF BOTH EYES, UNSPECIFIED ACUTE CONJUNCTIVITIS TYPE: ICD-10-CM

## 2022-12-12 PROCEDURE — 99284 EMERGENCY DEPT VISIT MOD MDM: CPT | Performed by: FAMILY MEDICINE

## 2022-12-12 RX ORDER — AMOXICILLIN 875 MG
875 TABLET ORAL 2 TIMES DAILY
Qty: 20 TABLET | Refills: 0 | Status: SHIPPED | OUTPATIENT
Start: 2022-12-12 | End: 2022-12-22

## 2022-12-12 RX ORDER — TOBRAMYCIN 3 MG/ML
1-2 SOLUTION/ DROPS OPHTHALMIC EVERY 4 HOURS
Qty: 5 ML | Refills: 0 | Status: SHIPPED | OUTPATIENT
Start: 2022-12-12 | End: 2022-12-17

## 2022-12-12 RX ORDER — OXYMETAZOLINE HYDROCHLORIDE 0.05 G/100ML
1 SPRAY NASAL 2 TIMES DAILY
Refills: 0 | COMMUNITY
Start: 2022-12-12 | End: 2022-12-15

## 2022-12-12 ASSESSMENT — VISUAL ACUITY: OU: 1

## 2022-12-12 ASSESSMENT — ACTIVITIES OF DAILY LIVING (ADL): ADLS_ACUITY_SCORE: 35

## 2022-12-12 NOTE — ED TRIAGE NOTES
Rt ear pain and bilat eye redness and drainage.     Triage Assessment     Row Name 12/12/22 [id number]       Triage Assessment (Adult)    Airway WDL X       Respiratory WDL    Respiratory WDL WDL       Skin Circulation/Temperature WDL    Skin Circulation/Temperature WDL WDL       Cardiac WDL    Cardiac WDL WDL       Peripheral/Neurovascular WDL    Peripheral Neurovascular WDL WDL       Cognitive/Neuro/Behavioral WDL    Cognitive/Neuro/Behavioral WDL WDL

## 2022-12-12 NOTE — DISCHARGE INSTRUCTIONS
Please read and follow the handout(s) instructions. Return, if needed, for increased or worsening symptoms and as directed by the handout(s).    Yogurt orally twice a day while on the antibiotics may help prevent diarrhea and secondary infections caused by the antibiotic use.    The ordered meds should be okay with breast-feeding..

## 2022-12-12 NOTE — ED PROVIDER NOTES
History     Chief Complaint   Patient presents with     Otalgia     Conjunctivitis     HPI  Iza Roberson is a 21 year old female who presented emergency room today secondary concerns of waking this morning with her eyes crusted shut with redness to her eyes bilaterally but the right worse than the left.  She is also had right ear pain for the last 2 days without drainage from that ear.  She does have a tendency to get ear infections.  She states that she diagnosed with RSV 2 weeks ago and is still having some occasional cough episodes but denies any shortness of breath.  She states that she is currently breast-feeding but denies pregnancy.  She also has a history for asthma.  She has multiple drug allergies.    Allergies:  Allergies   Allergen Reactions     Candida Antigen Hives     Dogs Hives     Lactose Hives     Azithromycin      Hydroxyzine Hives     9/18/15 - extensive hives - seen at  in [place] and prescribed prednisone.      Metronidazole GI Disturbance     Vomiting     Propranolol Itching     Soap Hives     Lever 2000 soap     Cetirizine Rash     Sulfa Drugs Hives and Rash     Universal maculopapular rash rash - see photos in note from 9/17/10       Problem List:    There are no problems to display for this patient.       Past Medical History:    No past medical history on file.    Past Surgical History:    No past surgical history on file.    Family History:    Family History   Problem Relation Age of Onset     Asthma Mother      Hypertension Mother      Hypertension Maternal Grandfather      Hypertension Paternal Grandmother        Social History:  Marital Status:  Single [1]  Social History     Tobacco Use     Smoking status: Some Days     Types: Other     Smokeless tobacco: Former     Types: Chew   Substance Use Topics     Alcohol use: Yes     Comment: minimal/social     Drug use: Yes     Types: Marijuana        Medications:    albuterol (VENTOLIN HFA) 108 (90 Base)  "MCG/ACT inhaler  amoxicillin (AMOXIL) 875 MG tablet  oxymetazoline (AFRIN NASAL SPRAY) 0.05 % nasal spray  tobramycin (TOBREX) 0.3 % ophthalmic solution  ipratropium - albuterol 0.5 mg/2.5 mg/3 mL (DUONEB) 0.5-2.5 (3) MG/3ML neb solution          Review of Systems   All other systems reviewed and are negative.      Physical Exam   BP: 120/73  Pulse: 73  Temp: 97.6  F (36.4  C)  Resp: 18  Height: 167.6 cm (5' 6\")  Weight: 71.5 kg (157 lb 11.2 oz)  SpO2: 97 %      Physical Exam  Vitals and nursing note reviewed.   Constitutional:       General: She is in acute distress (Mild).   HENT:      Right Ear: Ear canal and external ear normal. No drainage. Tympanic membrane is erythematous and bulging.      Left Ear: Tympanic membrane, ear canal and external ear normal. No drainage.      Nose: Congestion present.   Eyes:      General: Lids are normal. Vision grossly intact.      Extraocular Movements: Extraocular movements intact.      Conjunctiva/sclera:      Right eye: Right conjunctiva is injected.      Left eye: Left conjunctiva is injected.   Cardiovascular:      Rate and Rhythm: Normal rate.      Pulses: Normal pulses.   Pulmonary:      Effort: Pulmonary effort is normal.   Musculoskeletal:      Cervical back: Neck supple.   Skin:     Findings: No rash.   Neurological:      Mental Status: She is alert and oriented to person, place, and time.         ED Course                 Procedures              Critical Care time:  none                       Assessments & Plan (with Medical Decision Making)  21-year-old female to the ER with complaints of right-sided ear pain x2 days and awaking this morning with redness and crusty eyes.  Recent history for RSV infection.  Exam reveals evidence for red erythematous right tympanic membrane.  Bilateral conjunctival injection with some drainage from the eyes bilaterally.  Medication prescribed as listed below.  Patient to follow-up in our clinic if not improved over the next 2 to 3 " days.  To return to the ER for increase or worsening symptoms if needed.     I have reviewed the nursing notes.    I have reviewed the findings, diagnosis, plan and need for follow up with the patient.       New Prescriptions    AMOXICILLIN (AMOXIL) 875 MG TABLET    Take 1 tablet (875 mg) by mouth 2 times daily for 10 days    OXYMETAZOLINE (AFRIN NASAL SPRAY) 0.05 % NASAL SPRAY    Spray 1 spray in nostril 2 times daily for 3 days 1 spray to each nostril twice a day. Do not use for more than 3 days!!!!    TOBRAMYCIN (TOBREX) 0.3 % OPHTHALMIC SOLUTION    Place 1-2 drops into both eyes every 4 hours for 5 days            I verbally discussed the findings of the evaluation today in the ER. I have verbally discussed with Iza the suggested treatment(s) as described in the discharge instructions and handouts. I have prescribed the above listed medications and instructed her on appropriate use of these medications.      I have verbally suggested she follow-up in her clinic or return to the ER for increased symptoms. See the follow-up recommendations documented  in the after visit summary in this visit's EPIC chart.      Disclaimer: This note consists of words and symbols derived from keyboarding and dictation using voice recognition software.  As a result, there may be errors that have gone undetected.  Please consider this when interpreting information found in this note.    Final diagnoses:   Acute conjunctivitis of both eyes, unspecified acute conjunctivitis type   Otalgia, right   OME (otitis media with effusion), right       12/12/2022   [facility] EMERGENCY DEPT     Valdemar Santiago,   12/12/22 0713

## 2024-02-29 ENCOUNTER — HOSPITAL ENCOUNTER (EMERGENCY)
Facility: CLINIC | Age: 24
Discharge: HOME | End: 2024-02-29
Attending: PHYSICIAN ASSISTANT | Admitting: PHYSICIAN ASSISTANT
Payer: COMMERCIAL

## 2024-02-29 VITALS
BODY MASS INDEX: 21.69 KG/M2 | TEMPERATURE: 98 F | OXYGEN SATURATION: 97 % | DIASTOLIC BLOOD PRESSURE: 69 MMHG | HEART RATE: 81 BPM | HEIGHT: 66 IN | WEIGHT: 135 LBS | SYSTOLIC BLOOD PRESSURE: 119 MMHG | RESPIRATION RATE: 18 BRPM

## 2024-02-29 DIAGNOSIS — Z11.3 SCREEN FOR STD (SEXUALLY TRANSMITTED DISEASE): ICD-10-CM

## 2024-02-29 LAB
ALBUMIN UR-MCNC: NEGATIVE MG/DL
APPEARANCE UR: CLEAR
BACTERIA #/AREA URNS HPF: ABNORMAL /HPF
BILIRUB UR QL STRIP: NEGATIVE
CLUE CELLS: NORMAL
COLOR UR AUTO: ABNORMAL
GLUCOSE UR STRIP-MCNC: NEGATIVE MG/DL
HCG UR QL: NEGATIVE
HGB UR QL STRIP: NEGATIVE
KETONES UR STRIP-MCNC: NEGATIVE MG/DL
LEUKOCYTE ESTERASE UR QL STRIP: NEGATIVE
MUCOUS THREADS #/AREA URNS LPF: PRESENT /LPF
NITRATE UR QL: NEGATIVE
PH UR STRIP: 7 [PH] (ref 5–7)
RBC URINE: 1 /HPF
SP GR UR STRIP: 1 (ref 1–1.03)
SQUAMOUS EPITHELIAL: <1 /HPF
T PALLIDUM AB SER QL: NONREACTIVE
TRICHOMONAS, WET PREP: NORMAL
UROBILINOGEN UR STRIP-MCNC: NORMAL MG/DL
WBC URINE: 1 /HPF
WBC'S/HIGH POWER FIELD, WET PREP: NORMAL
YEAST, WET PREP: NORMAL

## 2024-02-29 PROCEDURE — 87591 N.GONORRHOEAE DNA AMP PROB: CPT | Performed by: PHYSICIAN ASSISTANT

## 2024-02-29 PROCEDURE — 99283 EMERGENCY DEPT VISIT LOW MDM: CPT | Performed by: PHYSICIAN ASSISTANT

## 2024-02-29 PROCEDURE — 81001 URINALYSIS AUTO W/SCOPE: CPT | Performed by: PHYSICIAN ASSISTANT

## 2024-02-29 PROCEDURE — 87210 SMEAR WET MOUNT SALINE/INK: CPT | Performed by: PHYSICIAN ASSISTANT

## 2024-02-29 PROCEDURE — 36415 COLL VENOUS BLD VENIPUNCTURE: CPT | Performed by: PHYSICIAN ASSISTANT

## 2024-02-29 PROCEDURE — 99284 EMERGENCY DEPT VISIT MOD MDM: CPT | Performed by: PHYSICIAN ASSISTANT

## 2024-02-29 PROCEDURE — 86780 TREPONEMA PALLIDUM: CPT | Performed by: PHYSICIAN ASSISTANT

## 2024-02-29 PROCEDURE — 81025 URINE PREGNANCY TEST: CPT | Performed by: PHYSICIAN ASSISTANT

## 2024-02-29 PROCEDURE — 87491 CHLMYD TRACH DNA AMP PROBE: CPT | Performed by: PHYSICIAN ASSISTANT

## 2024-02-29 ASSESSMENT — ACTIVITIES OF DAILY LIVING (ADL)
ADLS_ACUITY_SCORE: 33
ADLS_ACUITY_SCORE: 33

## 2024-02-29 ASSESSMENT — COLUMBIA-SUICIDE SEVERITY RATING SCALE - C-SSRS
2. HAVE YOU ACTUALLY HAD ANY THOUGHTS OF KILLING YOURSELF IN THE PAST MONTH?: NO
1. IN THE PAST MONTH, HAVE YOU WISHED YOU WERE DEAD OR WISHED YOU COULD GO TO SLEEP AND NOT WAKE UP?: NO
6. HAVE YOU EVER DONE ANYTHING, STARTED TO DO ANYTHING, OR PREPARED TO DO ANYTHING TO END YOUR LIFE?: NO
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
## 2024-02-29 NOTE — DISCHARGE INSTRUCTIONS
Your gonorrhea, chlamydia, and syphilis testing will result in the next 24 hours.  If they are positive you will receive a call to initiate treatment.    You are outside the window of efficacy for the Plan B pill.  There is a chance you could become pregnant from this sexual encounter.    Follow-up with your clinic provider for any further care needed.  If you do have any worsening symptoms please do not hesitate to return to the emergency department.    Thank you for choosing [facility]'s Emergency Department. It was a pleasure taking care of you today. If you have any questions, please call [phone].    Aida Brannon PA-C
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
## 2024-03-01 LAB
C TRACH DNA SPEC QL NAA+PROBE: NEGATIVE
N GONORRHOEA DNA SPEC QL NAA+PROBE: NEGATIVE